# Patient Record
Sex: FEMALE | Race: WHITE | NOT HISPANIC OR LATINO | URBAN - METROPOLITAN AREA
[De-identification: names, ages, dates, MRNs, and addresses within clinical notes are randomized per-mention and may not be internally consistent; named-entity substitution may affect disease eponyms.]

---

## 2020-10-12 ENCOUNTER — OFFICE VISIT (OUTPATIENT)
Dept: URBAN - METROPOLITAN AREA CLINIC 121 | Facility: CLINIC | Age: 50
End: 2020-10-12

## 2021-03-05 ENCOUNTER — OFFICE VISIT (OUTPATIENT)
Dept: URBAN - METROPOLITAN AREA CLINIC 63 | Facility: CLINIC | Age: 51
End: 2021-03-05

## 2021-04-07 ENCOUNTER — OFFICE VISIT (OUTPATIENT)
Dept: URBAN - METROPOLITAN AREA CLINIC 63 | Facility: CLINIC | Age: 51
End: 2021-04-07

## 2021-05-04 ENCOUNTER — OFFICE VISIT (OUTPATIENT)
Dept: URBAN - METROPOLITAN AREA SURGERY CENTER 4 | Facility: SURGERY CENTER | Age: 51
End: 2021-05-04

## 2021-05-06 LAB — PATHOLOGY (INDENTED REPORT): (no result)

## 2021-05-18 ENCOUNTER — OFFICE VISIT (OUTPATIENT)
Dept: URBAN - METROPOLITAN AREA CLINIC 63 | Facility: CLINIC | Age: 51
End: 2021-05-18

## 2022-06-28 ENCOUNTER — WEB ENCOUNTER (OUTPATIENT)
Dept: URBAN - METROPOLITAN AREA CLINIC 63 | Facility: CLINIC | Age: 52
End: 2022-06-28

## 2022-06-28 ENCOUNTER — CLAIMS CREATED FROM THE CLAIM WINDOW (OUTPATIENT)
Dept: URBAN - METROPOLITAN AREA CLINIC 63 | Facility: CLINIC | Age: 52
End: 2022-06-28
Payer: COMMERCIAL

## 2022-06-28 ENCOUNTER — LAB OUTSIDE AN ENCOUNTER (OUTPATIENT)
Dept: URBAN - METROPOLITAN AREA CLINIC 63 | Facility: CLINIC | Age: 52
End: 2022-06-28

## 2022-06-28 VITALS
WEIGHT: 140 LBS | DIASTOLIC BLOOD PRESSURE: 84 MMHG | HEIGHT: 65 IN | OXYGEN SATURATION: 96 % | TEMPERATURE: 98 F | BODY MASS INDEX: 23.32 KG/M2 | HEART RATE: 80 BPM | SYSTOLIC BLOOD PRESSURE: 144 MMHG

## 2022-06-28 DIAGNOSIS — Z12.11 ENCOUNTER FOR SCREENING FOR MALIGNANT NEOPLASM OF COLON: ICD-10-CM

## 2022-06-28 DIAGNOSIS — K21.00 GASTRO-ESOPHAGEAL REFLUX DISEASE WITH ESOPHAGITIS, WITHOUT BLEEDING: ICD-10-CM

## 2022-06-28 PROCEDURE — 99213 OFFICE O/P EST LOW 20 MIN: CPT | Performed by: NURSE PRACTITIONER

## 2022-06-28 PROCEDURE — 99203 OFFICE O/P NEW LOW 30 MIN: CPT | Performed by: NURSE PRACTITIONER

## 2022-06-28 RX ORDER — LACOSAMIDE 100 MG/1
TABLET, FILM COATED ORAL
Qty: 180 TABLET | Refills: 0 | Status: ACTIVE | COMMUNITY

## 2022-06-28 RX ORDER — TRAZODONE HYDROCHLORIDE 50 MG/1
TAKE ONE TABLET BY MOUTH AT BEDTIME AS NEEDED TABLET ORAL
Qty: 90 UNSPECIFIED | Refills: 0 | Status: ACTIVE | COMMUNITY

## 2022-06-28 RX ORDER — SERTRALINE HYDROCHLORIDE 50 MG/1
TAKE ONE TABLET BY MOUTH ONE TIME DAILY TABLET ORAL
Qty: 90 UNSPECIFIED | Refills: 1 | Status: ACTIVE | COMMUNITY

## 2022-06-28 RX ORDER — FAMOTIDINE 20 MG/1
1 TABLET QAM TABLET, FILM COATED ORAL ONCE A DAY
Qty: 90 | Refills: 3 | OUTPATIENT
Start: 2022-06-28

## 2022-06-28 RX ORDER — LACOSAMIDE 100 MG/1
TAKE ONE TABLET BY MOUTH TWICE A DAY TABLET, FILM COATED ORAL
Qty: 180 UNSPECIFIED | Refills: 1 | Status: ACTIVE | COMMUNITY

## 2022-06-28 NOTE — HPI-TODAY'S VISIT:
Thank you very much for kindly referring Adelaida Edwards, a very pleasant 51-year-old female, back to our service for surveillance colonoscopy.  Past medical history is significant for left astrocytoma (grade 3, 2004), seizures, diabetes insipidus, depression, GERD and colon polyps (tubular adenoma (2019).  Past surgical history significant for craniotomy, appendectomy and back surgery.  Her last complete colonoscopy was 2019 (see results below). Adelaida presents today without gastrointestinal complaint and admits to 1-2 unremarkable bowel movements per day of soft brown stool.  She uses Pepcid 20 mg daily with good efficacy.  She denies pyrosis, dysphagia, dyspepsia, abdominal pain, change in bowel habits, rectal bleeding, melena or unintentional weight loss. ************************ Ultrasound of the abdomen Limited/08 April 2021.  1.  Minimal gallbladder wall thickening to 0.4 cm, but no calculi or sludge.  Otherwise, right upper quadrant ultrasound.  2.  If there is clinical concern for gallbladder pathology, consider nuclear medicine hepatobiliary imaging.  Lab work dated 19 November 2018 demonstrates the following abnormality: Hematocrit 45.1%.  All remaining lab values of CBC, CMP, lipid panel, and TSH are within normal limits.

## 2022-06-28 NOTE — PHYSICAL EXAM GASTROINTESTINAL
: Abdomen; soft, nontender, nondistended , no guarding or rigidity, no masses palpable , normal bowel sounds.  Liver and Spleen; no hepatomegaly present,  liver nontender , spleen not palpable

## 2022-06-28 NOTE — PHYSICAL EXAM CHEST:
no deformities, no chest wall non-tenderness, breathing is unlabored without accessory muscle use, normal breath sounds

## 2022-06-28 NOTE — HPI-PREVIOUS PROCEDURES
EGD/04 May 2021.  Unremarkable esophagus.  An incidental Schatzki's ring was found at the gastroesophageal junction.  1 erosion with no stigmata of recent bleeding was found on the greater curvature of the proximal gastric body.  Mild gastritis found in the gastric antrum.  Unremarkable duodenum.  Pathology demonstrates focal minimal chronic gastritis in the antral biopsy, benign gastric mucosa with area of mild reactive glandular change in the gastric body biopsy and focal mild reflux type changes in the lower third esophageal biopsy.  All remaining findings are negative or benign.  Colonoscopy/14 February 2019.  Two tubular adenomatous polyps (7-8 mm) removed from the mid sigmoid colon.  11 mm advanced tubular adenomatous polyp in the rectosigmoid colon.  Internal hemorrhoids present.  The entire colon was fixed and tortuous.  All remaining findings are negative or benign.  Recommend repeat colonoscopy in 3 years due to advanced tubular adenomatous polyp removed on this procedure.

## 2022-06-28 NOTE — PHYSICAL EXAM NECK/THYROID:
normal appearance , without tenderness upon palpation , no deformities , trachea midline, no masses , no JVD.

## 2022-07-07 ENCOUNTER — OFFICE VISIT (OUTPATIENT)
Dept: URBAN - METROPOLITAN AREA SURGERY CENTER 4 | Facility: SURGERY CENTER | Age: 52
End: 2022-07-07
Payer: COMMERCIAL

## 2022-07-07 DIAGNOSIS — K64.1 SECOND DEGREE HEMORRHOIDS: ICD-10-CM

## 2022-07-07 DIAGNOSIS — Z86.010 COLON POLYP HISTORY: ICD-10-CM

## 2022-07-07 PROCEDURE — G8907 PT DOC NO EVENTS ON DISCHARG: HCPCS | Performed by: INTERNAL MEDICINE

## 2022-07-07 PROCEDURE — G0105 COLORECTAL SCRN; HI RISK IND: HCPCS | Performed by: INTERNAL MEDICINE

## 2022-07-07 PROCEDURE — G8918 PT W/O PREOP ORDER IV AB PRO: HCPCS | Performed by: INTERNAL MEDICINE

## 2022-07-07 RX ORDER — LACOSAMIDE 100 MG/1
TABLET, FILM COATED ORAL
Qty: 180 TABLET | Refills: 0 | Status: ACTIVE | COMMUNITY

## 2022-07-07 RX ORDER — FAMOTIDINE 20 MG/1
1 TABLET QAM TABLET, FILM COATED ORAL ONCE A DAY
Qty: 90 | Refills: 3 | OUTPATIENT

## 2022-07-07 RX ORDER — LACOSAMIDE 100 MG/1
TAKE ONE TABLET BY MOUTH TWICE A DAY TABLET, FILM COATED ORAL
Qty: 180 UNSPECIFIED | Refills: 1 | Status: ACTIVE | COMMUNITY

## 2022-07-07 RX ORDER — TRAZODONE HYDROCHLORIDE 50 MG/1
TAKE ONE TABLET BY MOUTH AT BEDTIME AS NEEDED TABLET ORAL
Qty: 90 UNSPECIFIED | Refills: 0 | Status: ACTIVE | COMMUNITY

## 2022-07-07 RX ORDER — SERTRALINE HYDROCHLORIDE 50 MG/1
TAKE ONE TABLET BY MOUTH ONE TIME DAILY TABLET ORAL
Qty: 90 UNSPECIFIED | Refills: 1 | Status: ACTIVE | COMMUNITY

## 2022-07-07 RX ORDER — FAMOTIDINE 20 MG/1
1 TABLET QAM TABLET, FILM COATED ORAL ONCE A DAY
Qty: 90 | Refills: 3 | Status: ACTIVE | COMMUNITY
Start: 2022-06-28

## 2022-07-09 ENCOUNTER — TELEPHONE ENCOUNTER (OUTPATIENT)
Dept: URBAN - METROPOLITAN AREA CLINIC 121 | Facility: CLINIC | Age: 52
End: 2022-07-09

## 2022-07-09 RX ORDER — CLONAZEPAM 0.12 MG/1
TABLET, ORALLY DISINTEGRATING ORAL TWICE A DAY
Refills: 0 | OUTPATIENT
Start: 2019-01-11 | End: 2019-03-06

## 2022-07-09 RX ORDER — OMEPRAZOLE 40 MG/1
TAKE ONE CAP, PO, 30 MIN PRIOR TO MORNING MEAL CAPSULE, DELAYED RELEASE ORAL ONCE A DAY
Refills: 0 | OUTPATIENT
Start: 2021-03-05 | End: 2021-04-07

## 2022-07-09 RX ORDER — CLONAZEPAM 0.12 MG/1
TAPPERING OFF TABLET, ORALLY DISINTEGRATING ORAL TAKE AS DIRECTED
Refills: 0 | OUTPATIENT
Start: 2019-03-06 | End: 2021-03-05

## 2022-07-10 ENCOUNTER — TELEPHONE ENCOUNTER (OUTPATIENT)
Dept: URBAN - METROPOLITAN AREA CLINIC 121 | Facility: CLINIC | Age: 52
End: 2022-07-10

## 2022-07-10 RX ORDER — LACOSAMIDE 200 MG/1
TABLET, FILM COATED ORAL TWICE A DAY
Refills: 0 | Status: ACTIVE | COMMUNITY
Start: 2019-01-11

## 2022-07-10 RX ORDER — TRAZODONE HYDROCHLORIDE 50 MG/1
TABLET ORAL
Refills: 0 | Status: ACTIVE | COMMUNITY
Start: 2021-03-15

## 2022-07-10 RX ORDER — OMEPRAZOLE 40 MG/1
TAKE ONE CAP, PO, 30 MIN PRIOR TO EVENING MEAL CAPSULE, DELAYED RELEASE ORAL ONCE A DAY
Refills: 1 | Status: ACTIVE | COMMUNITY
Start: 2021-04-07

## 2022-07-20 PROBLEM — 428283002 HISTORY OF POLYP OF COLON: Status: ACTIVE | Noted: 2022-07-20

## 2022-08-02 ENCOUNTER — OFFICE VISIT (OUTPATIENT)
Dept: URBAN - METROPOLITAN AREA CLINIC 63 | Facility: CLINIC | Age: 52
End: 2022-08-02

## 2022-08-25 ENCOUNTER — WEB ENCOUNTER (OUTPATIENT)
Dept: URBAN - METROPOLITAN AREA CLINIC 63 | Facility: CLINIC | Age: 52
End: 2022-08-25

## 2022-08-26 ENCOUNTER — OFFICE VISIT (OUTPATIENT)
Dept: URBAN - METROPOLITAN AREA CLINIC 63 | Facility: CLINIC | Age: 52
End: 2022-08-26

## 2022-08-26 ENCOUNTER — DASHBOARD ENCOUNTERS (OUTPATIENT)
Age: 52
End: 2022-08-26

## 2022-08-26 RX ORDER — TRAZODONE HYDROCHLORIDE 50 MG/1
TABLET ORAL
Refills: 0 | Status: ACTIVE | COMMUNITY
Start: 2021-03-15

## 2022-08-26 RX ORDER — LACOSAMIDE 100 MG/1
TABLET, FILM COATED ORAL
Qty: 180 TABLET | Refills: 0 | Status: ACTIVE | COMMUNITY

## 2022-08-26 RX ORDER — FAMOTIDINE 20 MG/1
1 TABLET QAM TABLET, FILM COATED ORAL ONCE A DAY
Qty: 90 | Refills: 3 | Status: ACTIVE | COMMUNITY

## 2022-08-26 RX ORDER — TRAZODONE HYDROCHLORIDE 50 MG/1
TAKE ONE TABLET BY MOUTH AT BEDTIME AS NEEDED TABLET ORAL
Qty: 90 UNSPECIFIED | Refills: 0 | Status: ACTIVE | COMMUNITY

## 2022-08-26 RX ORDER — LACOSAMIDE 100 MG/1
TAKE ONE TABLET BY MOUTH TWICE A DAY TABLET, FILM COATED ORAL
Qty: 180 UNSPECIFIED | Refills: 1 | Status: ACTIVE | COMMUNITY

## 2022-08-26 RX ORDER — LACOSAMIDE 200 MG/1
TABLET, FILM COATED ORAL TWICE A DAY
Refills: 0 | Status: ACTIVE | COMMUNITY
Start: 2019-01-11

## 2022-08-26 RX ORDER — OMEPRAZOLE 40 MG/1
TAKE ONE CAP, PO, 30 MIN PRIOR TO EVENING MEAL CAPSULE, DELAYED RELEASE ORAL ONCE A DAY
Refills: 1 | Status: ACTIVE | COMMUNITY
Start: 2021-04-07

## 2022-08-26 RX ORDER — SERTRALINE HYDROCHLORIDE 50 MG/1
TAKE ONE TABLET BY MOUTH ONE TIME DAILY TABLET ORAL
Qty: 90 UNSPECIFIED | Refills: 1 | Status: ACTIVE | COMMUNITY

## 2022-08-26 NOTE — HPI-PREVIOUS PROCEDURES
Colonoscopy/07 July 2022.  Internal hemorrhoids present.  The examination was otherwise normal on direct and retroflexion views.  No specimens collected.  Recommend repeat colonoscopy in 5 years due to personal history of adenomatous polyps. ********** EGD/04 May 2021.  Unremarkable esophagus.  An incidental Schatzki's ring was found at the gastroesophageal junction.  1 erosion with no stigmata of recent bleeding was found on the greater curvature of the proximal gastric body.  Mild gastritis found in the gastric antrum.  Unremarkable duodenum.  Pathology demonstrates focal minimal chronic gastritis in the antral biopsy, benign gastric mucosa with area of mild reactive glandular change in the gastric body biopsy and focal mild reflux type changes in the lower third esophageal biopsy.  All remaining findings are negative or benign. ********** Colonoscopy/14 February 2019.  Two tubular adenomatous polyps (7-8 mm) removed from the mid sigmoid colon.  11 mm advanced tubular adenomatous polyp in the rectosigmoid colon.  Internal hemorrhoids present.  The entire colon was fixed and tortuous.  All remaining findings are negative or benign.  Recommend repeat colonoscopy in 3 years due to advanced tubular adenomatous polyp removed on this procedure.

## 2022-08-26 NOTE — HPI-PREVIOUS LABS
Lab work dated 19 November 2018 demonstrates the following abnormality: Hematocrit 45.1%.  All remaining lab values of CBC, CMP, lipid panel, and TSH are within normal limits. .

## 2022-08-26 NOTE — HPI-TODAY'S VISIT:
PMH: Brain cancer (left astrocytoma grade 3, 2004), seizures, diabetes insipidus, depression, GERD PSH: Craniotomy appendectomy, back  OLD NOTE: Thank you very much for kindly referring Adelaida Edwards, a very pleasant 51-year-old female, back to our service for surveillance colonoscopy.  Past medical history is significant for left astrocytoma (grade 3, 2004), seizures, diabetes insipidus, depression, GERD and colon polyps (tubular adenoma (2019).  Past surgical history significant for craniotomy, appendectomy and back surgery.  Her last complete colonoscopy was 2019 (see results below). Adelaida presents today without gastrointestinal complaint and admits to 1-2 unremarkable bowel movements per day of soft brown stool.  She uses Pepcid 20 mg daily with good efficacy.  She denies pyrosis, dysphagia, dyspepsia, abdominal pain, change in bowel habits, rectal bleeding, melena or unintentional weight loss.

## 2022-08-26 NOTE — HPI-PREVIOUS IMAGING
Ultrasound of the abdomen Limited/08 April 2021.  1.  Minimal gallbladder wall thickening to 0.4 cm, but no calculi or sludge.  Otherwise, right upper quadrant ultrasound.  2.  If there is clinical concern for gallbladder pathology, consider nuclear medicine hepatobiliary imaging.

## 2022-09-29 NOTE — PHYSICAL EXAM EYES:
Conjuntivae and eyelids appear normal,  Sclerae : White without injection Quinolones Counseling:  I discussed with the patient the risks of fluoroquinolones including but not limited to GI upset, allergic reaction, drug rash, diarrhea, dizziness, photosensitivity, yeast infections, liver function test abnormalities, tendonitis/tendon rupture.

## 2023-04-17 ENCOUNTER — ERX REFILL RESPONSE (OUTPATIENT)
Dept: URBAN - METROPOLITAN AREA CLINIC 63 | Facility: CLINIC | Age: 53
End: 2023-04-17

## 2023-04-17 RX ORDER — FAMOTIDINE 20 MG/1
TAKE ONE TABLET BY MOUTH EVERY MORNING ONE TIME DAILY FOR 90 DAYS TABLET, FILM COATED ORAL
Qty: 90 TABLET | Refills: 0 | OUTPATIENT

## 2023-04-17 RX ORDER — FAMOTIDINE 20 MG/1
1 TABLET QAM TABLET, FILM COATED ORAL ONCE A DAY
Qty: 90 | Refills: 3 | OUTPATIENT

## 2023-05-30 ENCOUNTER — ERX REFILL RESPONSE (OUTPATIENT)
Dept: URBAN - METROPOLITAN AREA CLINIC 63 | Facility: CLINIC | Age: 53
End: 2023-05-30

## 2023-05-30 RX ORDER — FAMOTIDINE 20 MG/1
TAKE ONE TABLET BY MOUTH EVERY MORNING ONE TIME DAILY FOR 90 DAYS TABLET, FILM COATED ORAL
Qty: 90 TABLET | Refills: 0 | OUTPATIENT